# Patient Record
Sex: MALE | Race: BLACK OR AFRICAN AMERICAN | Employment: OTHER | URBAN - METROPOLITAN AREA
[De-identification: names, ages, dates, MRNs, and addresses within clinical notes are randomized per-mention and may not be internally consistent; named-entity substitution may affect disease eponyms.]

---

## 2023-02-21 RX ORDER — EPINEPHRINE 0.1 MG/ML
1 SYRINGE (ML) INJECTION ONCE
Status: CANCELLED | OUTPATIENT
Start: 2023-02-21 | End: 2023-02-21

## 2023-02-21 RX ORDER — GLYCOPYRROLATE 0.2 MG/ML
0.1 INJECTION INTRAMUSCULAR; INTRAVENOUS ONCE
Status: CANCELLED | OUTPATIENT
Start: 2023-02-21 | End: 2023-02-21

## 2023-02-21 RX ORDER — SIMETHICONE 20 MG/.3ML
40 EMULSION ORAL EVERY 6 HOURS PRN
Status: CANCELLED | OUTPATIENT
Start: 2023-02-21

## 2023-02-21 RX ORDER — DIPHENHYDRAMINE HYDROCHLORIDE 50 MG/ML
25 INJECTION INTRAMUSCULAR; INTRAVENOUS EVERY 6 HOURS PRN
Status: CANCELLED | OUTPATIENT
Start: 2023-02-21

## 2023-02-22 ENCOUNTER — HOSPITAL ENCOUNTER (OUTPATIENT)
Facility: HOSPITAL | Age: 68
Setting detail: OUTPATIENT SURGERY
Discharge: HOME | End: 2023-02-22
Attending: INTERNAL MEDICINE | Admitting: INTERNAL MEDICINE
Payer: MEDICARE

## 2023-02-22 VITALS
OXYGEN SATURATION: 100 % | HEIGHT: 72 IN | HEART RATE: 74 BPM | WEIGHT: 299.2 LBS | SYSTOLIC BLOOD PRESSURE: 119 MMHG | BODY MASS INDEX: 40.52 KG/M2 | DIASTOLIC BLOOD PRESSURE: 66 MMHG | TEMPERATURE: 98.1 F | RESPIRATION RATE: 16 BRPM

## 2023-02-22 PROCEDURE — 99152 MOD SED SAME PHYS/QHP 5/>YRS: CPT | Performed by: INTERNAL MEDICINE

## 2023-02-22 PROCEDURE — 2709999900 HC NON-CHARGEABLE SUPPLY: Performed by: INTERNAL MEDICINE

## 2023-02-22 PROCEDURE — 3600007512: Performed by: INTERNAL MEDICINE

## 2023-02-22 PROCEDURE — 2580000003 HC RX 258: Performed by: INTERNAL MEDICINE

## 2023-02-22 PROCEDURE — 99153 MOD SED SAME PHYS/QHP EA: CPT | Performed by: INTERNAL MEDICINE

## 2023-02-22 PROCEDURE — 3600007502: Performed by: INTERNAL MEDICINE

## 2023-02-22 PROCEDURE — 88305 TISSUE EXAM BY PATHOLOGIST: CPT

## 2023-02-22 PROCEDURE — 7100000011 HC PHASE II RECOVERY - ADDTL 15 MIN: Performed by: INTERNAL MEDICINE

## 2023-02-22 PROCEDURE — 7100000010 HC PHASE II RECOVERY - FIRST 15 MIN: Performed by: INTERNAL MEDICINE

## 2023-02-22 PROCEDURE — 6360000002 HC RX W HCPCS: Performed by: INTERNAL MEDICINE

## 2023-02-22 RX ORDER — MIDAZOLAM HYDROCHLORIDE 1 MG/ML
5 INJECTION, SOLUTION INTRAMUSCULAR; INTRAVENOUS
Status: DISCONTINUED | OUTPATIENT
Start: 2023-02-22 | End: 2023-02-22 | Stop reason: HOSPADM

## 2023-02-22 RX ORDER — NALOXONE HYDROCHLORIDE 0.4 MG/ML
0.4 INJECTION, SOLUTION INTRAMUSCULAR; INTRAVENOUS; SUBCUTANEOUS PRN
Status: DISCONTINUED | OUTPATIENT
Start: 2023-02-22 | End: 2023-02-22 | Stop reason: HOSPADM

## 2023-02-22 RX ORDER — ACETAMINOPHEN 325 MG/1
325 TABLET ORAL AS NEEDED
COMMUNITY

## 2023-02-22 RX ORDER — PANTOPRAZOLE SODIUM 40 MG/1
1 TABLET, DELAYED RELEASE ORAL DAILY
COMMUNITY
Start: 2019-11-20

## 2023-02-22 RX ORDER — SODIUM CHLORIDE 9 MG/ML
INJECTION, SOLUTION INTRAVENOUS CONTINUOUS
Status: DISCONTINUED | OUTPATIENT
Start: 2023-02-22 | End: 2023-02-22 | Stop reason: HOSPADM

## 2023-02-22 RX ORDER — M-VIT,TX,IRON,MINS/CALC/FOLIC 27MG-0.4MG
1 TABLET ORAL DAILY
COMMUNITY

## 2023-02-22 RX ORDER — FENTANYL CITRATE 50 UG/ML
INJECTION, SOLUTION INTRAMUSCULAR; INTRAVENOUS PRN
Status: DISCONTINUED | OUTPATIENT
Start: 2023-02-22 | End: 2023-02-22 | Stop reason: ALTCHOICE

## 2023-02-22 RX ORDER — FLUMAZENIL 0.1 MG/ML
0.2 INJECTION INTRAVENOUS ONCE
Status: DISCONTINUED | OUTPATIENT
Start: 2023-02-22 | End: 2023-02-22 | Stop reason: HOSPADM

## 2023-02-22 RX ORDER — SODIUM CHLORIDE 9 MG/ML
INJECTION, SOLUTION INTRAVENOUS CONTINUOUS
Status: CANCELLED | OUTPATIENT
Start: 2023-02-22

## 2023-02-22 RX ORDER — TRIAMTERENE AND HYDROCHLOROTHIAZIDE 37.5; 25 MG/1; MG/1
1 TABLET ORAL DAILY
COMMUNITY
Start: 2017-05-28

## 2023-02-22 RX ORDER — ASPIRIN 81 MG/1
1 TABLET ORAL DAILY
COMMUNITY
Start: 2017-11-21

## 2023-02-22 RX ORDER — FEXOFENADINE HCL 180 MG/1
1 TABLET ORAL DAILY
COMMUNITY

## 2023-02-22 RX ORDER — MIDAZOLAM HYDROCHLORIDE 1 MG/ML
INJECTION INTRAMUSCULAR; INTRAVENOUS PRN
Status: DISCONTINUED | OUTPATIENT
Start: 2023-02-22 | End: 2023-02-22 | Stop reason: ALTCHOICE

## 2023-02-22 RX ORDER — DICYCLOMINE HCL 20 MG
20 TABLET ORAL DAILY
COMMUNITY
Start: 2017-06-05

## 2023-02-22 RX ORDER — AMLODIPINE BESYLATE 10 MG/1
10 TABLET ORAL DAILY
COMMUNITY
Start: 2017-05-29

## 2023-02-22 RX ORDER — ERGOCALCIFEROL 1.25 MG/1
50000 CAPSULE ORAL WEEKLY
COMMUNITY
Start: 2017-06-06

## 2023-02-22 RX ORDER — FENTANYL CITRATE 50 UG/ML
100 INJECTION, SOLUTION INTRAMUSCULAR; INTRAVENOUS
Status: DISCONTINUED | OUTPATIENT
Start: 2023-02-22 | End: 2023-02-22 | Stop reason: HOSPADM

## 2023-02-22 RX ORDER — LISINOPRIL 20 MG/1
20 TABLET ORAL DAILY
COMMUNITY
Start: 2017-06-07

## 2023-02-22 RX ORDER — NEBIVOLOL 10 MG/1
1 TABLET ORAL DAILY
COMMUNITY
Start: 2017-05-29

## 2023-02-22 RX ORDER — LEVOTHYROXINE SODIUM 0.1 MG/1
1 TABLET ORAL DAILY
COMMUNITY
Start: 2017-06-05

## 2023-02-22 RX ORDER — FLUTICASONE PROPIONATE 50 MCG
1 SPRAY, SUSPENSION (ML) NASAL DAILY
COMMUNITY
Start: 2017-11-01

## 2023-02-22 RX ADMIN — SODIUM CHLORIDE: 9 INJECTION, SOLUTION INTRAVENOUS at 09:13

## 2023-02-22 ASSESSMENT — PAIN - FUNCTIONAL ASSESSMENT: PAIN_FUNCTIONAL_ASSESSMENT: 0-10

## 2023-02-22 ASSESSMENT — PAIN SCALES - GENERAL: PAINLEVEL_OUTOF10: 0

## 2023-02-22 NOTE — PERIOP NOTE
Patient skin is warm, dry, and intact pre-procedure and post procedure. Skin warm, dry, and intact where grounding pad was placed to right posterior thigh for polyp removal with hot snare. Normal sinus rhythm maintained pre and post procedure. Medications reviewed and verified by . Polyps, hemorrhoids. Patient tolerated procedure well.

## 2023-02-22 NOTE — H&P
Assessment/Plan  # Detail Type Description    1. 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 Impression Pt of Dr. Rosy Fields, referred to GI for colonoscopy following two recurrences of Diverticulitis (treated). ______________________________________  Hx of Diverticulosis w/recurrent Diverticulitis:  (2/3/2022 - Presumed, no CT confirmation: Treated by PCP with Metronidazole 500mg three times daily and Cipro 500mg twice daily both for 7 days.)  (5/26/2022 - Presumed, no CT confirmation: Treated by PCP with Metronidazole 500mg three times daily and Cipro 500mg twice daily both for 7 days.)    *ABDOMINAL/KUB 2 VIEWS done on 4/18/2022-   Indication:  Right-sided pain for 1 week. Renal colic. Impression: No acute findings  ______________________________________  Pt states he still feels gassy/bloated (his usual diverticulitis presenting symptoms) - ? Residual inflammation  -Taking Simethicone has helped some. -[place]: #6 & #7, Needs more fiber. Patient Plan -Patient is advised to eat a 25 gram fiber diet, utilizing 'natural foods' and fiber supplements such as Benefiber (wheat dextran) or Metamucil (psyllium)    -Explained differences between Diverticulosis, versus complication of diverticulitis. Provided pt with printed handout explaining diverticulosis, diverticulitis, and recommended high fiber dietary changes. -Explained that diverticulitis puts the patient at increased risk of bowel perforation associated with taking strong laxatives like a bowel purge, or the colonoscopic procedure itself my result in perforation of inflamed diverticula. -Pt to contact office if lower abdominal pain worsens or if stool changes occur. As a CT scan would be necessary to either confirm or rule out acute diverticulitis, an abx treatment would need to be prescribed, if confirmed. 2. Assessment Personal hx of colonic polyps (Z86.010).     Impression Personal hx of colonic adenomas, last addressed in .  _________________________________  *Last colonoscopy (@95SO) was done on 2020 by Dr. Dylan Kirk @[facility]) Hx of adenomatous polyps. 2) Colon polyps x2, removed (Both TA). 3) Diverticulosis. 4) Internal Hemorrhoids. 3-5yr Recall recommended.    -Older records unavailable at this time, any records recovered will be scanned to chart at later date.  _________________________________  Average risk, no FHx of colon cancer. Asymptomatic of GI complaints. BM: 2/day  *PM/SH: Thyroid Dz, OA, HTN, Prediabetes, Prostate cancer (), Diverticulosis, IBS, GERD - Intermittent abd pains, N (no V). Patient Plan *C-scope Plan:  Colonoscopy ordered with Dr. Hayden Toro with Miralax bowel prep, and Mag Citrate 2 days before prep, and Miralax and stool softeners starting 3 days before prep.  -Patient is advised that they should take their aspirin (if prescribed) up until the day of procedure.  -Patient is advised that they should not take their Meloxicam the day before or the day of the procedure.  -Patient is advised to take Thyroid meds, BP meds, beta blockers, and any cardiac meds the AM of procedure with sip clear liquid after prep. *C-scope Risks:  Stressed importance of following all bowel preparation instructions. Explained the procedure to the patient including all risks and benefits. These risks consist of missed lesions on exam, bleeding, and bowel perforation with possible need for admission to the hospital, and in the most extensive of  circumstances, the patient may require surgery. Pt verbalized understanding of these risks and is agreeable with this procedure. Plan Orders Further diagnostic evaluations ordered today include(s) DIAGNOSTIC COLONOSCOPY to be performed. He will be scheduled for GASTROENTEROLOGY PROCEDURE, Next Lab Date is within 6 month on 2023.  Clinical information/comments: at location [facility]. The surgeon scheduled is Chris Gifford MD. An assistant has not been requested.         3. Assessment Family hx of colonic polyps (Z83.71).    Impression Mother - Colonic polyps.         4. Assessment GERD w/o esophagitis (K21.9).    Impression Hx of Mild-to-moderate Chronic GERD:    *PPI Therapy: Taking Pantoprazole 40mg QAM (proper timing)  *NSAID-use: Aleve, ASA, Meloxicam  *PRN Meds: Dicyclomine.    Patient Plan *PPI Dosing Instructions: (Printed copy provided to patient)  -Instructed patient to take PPI first thing in the morning on an empty stomach with a full glass of water, 30 minutes before eating any food to get the full effect of the medication as intended. Use Tums, or Maalox for breakthrough reflux symptoms.  ___________________________________  *GERD \"Anti-Reflux Diet\"- Patient is advised to:   1. Eat smaller meals and maintain and lowfat, low carb diet  2. Change to decaffeinated beverages only  3. Not eat within 3-4 hours of bedtime  4. To elevate the torso while sleeping at night either by sleeping on a foam incline wedge or by elevating the head of the bed.  5. Avoid alcohol, NSAIDs, citrus and acidic foods as these things can contribute to acid reflux.  (Enteric Coated Aspirin only - If prescribed)         5. Assessment Personal history of cancer of prostate (Z85.46).    Impression Hx of Prostate cancer (2010).         6. Assessment Body mass index (BMI) 40.0-44.9, adult (Z68.41).    Impression BMI: 40.69(Obese w/Tall Frame), These factors increase risks of procedural complications with sedation, and compromise airway maintenance. Special attention to airway management.              This 67 year old  patient was referred by Juvenal Nunez.    This 67 year old male presents for Hx polyp/colon cancer.    History of Present Illness  1.  Hx polyp/colon cancer   Prior screening:  colonoscopy.  Risk Factors: family history - parent and mother polyps.  Associated symptoms include abdominal pain and nausea.  Pertinent negatives include change in bowel habits, change in stool  caliber, constipation, decreased appetite, diarrhea, melena, rectal bleeding, vomiting, weight gain and weight loss. Additional information: No family history of colon cancer, Last colonoscopy was , Xray of abdomen 22 and BM 2x daily. 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 Kathleen Padgett prostate cancer  RS 2018 -Dr. Sandi Mauricio     eye surgery  RS 2018 - [facility]     eye surgery  RS 2018 - eye clinic Dr Hailey Mcneal, prostate         Cardiac Surgery     Claustrophobia       Diabetes       Hypertension             Family History   (Detailed)    Relationship Family Member Name  Age at Death Condition Onset Age Cause of Death   Brother  Y 67 Diabetes mellitus  N   Brother 2 N  Alive and well     Father  Y 58      Father    Cancer, lung  N   Father    Hypertension  N   Mother    Hypertension  N   Mother  Y 78 Diabetes mellitus  N   Sister  N  Alive and well       Social History  (Detailed)  Tobacco use reviewed. The patient is right-handed. Preferred language is [place]. The patient does not need an . Marital Status/Family/Social Support  Marital status: Single     Tobacco use status: Current non-smoker. Smoking status: Never smoker. Tobacco Screening  Patient has never used tobacco. Patient has not used tobacco in the last 30 days. Patient has not used smokeless tobacco in the last 30 days.     Smoking Status  Type Smoking Status Usage Per Day Years Used Pack Years Total Pack Years    Never smoker Tobacco/Vaping Exposure  No passive vaping exposure. No passive smoke exposure. Alcohol  There is no history of alcohol use. Caffeine  The patient uses caffeine: tea - 8 oz a day. Lifestyle  Never exercises. Pets   cats. Home Environment/Safety  The home has smoke detectors. No carbon monoxide detector at home. The home does not have radon present. Uses seat belts.        Medications (active prior to today)  Medication Instructions Start Date Stop Date Refilled Elsewhere   Aleve 220 mg tablet take 1 tablet by oral route  every 12 hours as needed //   Y   Flonase Allergy Relief 50 mcg/actuation nasal spray,suspension inhale 2 spray by intranasal route  every day in each nostril //   Y   aspirin 81 mg tablet,delayed release take 1 tablet by oral route  every day 12/17/2018 12/17/2018 N   chlorpheniramine 4 mg tablet take 1 tablet by oral route  every 4 hours as needed //   Y   meloxicam 7.5 mg tablet take 1 tablet by oral route  every day 10/05/2020   N   Shingrix (PF) 50 mcg/0.5 mL intramuscular suspension, kit inject 0.5 milliliter by intramuscular route once 01/05/2021   N   Xyzal 5 mg tablet take 1 tablet by oral route  every day in the evening 03/25/2021 03/25/2021 N   MONTELUKAST SOD 10 MG TABLET TAKE 1 TABLET BY MOUTH EVERY DAY IN THE EVENING 03/31/2021 03/31/2021 N   AMLODIPINE BESYLATE 10 MG TAB TAKE 1 TABLET BY MOUTH EVERY DAY 10/28/2021  10/28/2021 N   LEVOTHYROXINE 100 MCG TABLET TAKE 1 TABLET BY MOUTH EVERY DAY 12/28/2021 12/28/2021 N   PANTOPRAZOLE SOD DR 40 MG TAB TAKE 1 TABLET BY MOUTH EVERY DAY 12/28/2021 12/28/2021 N   NEBIVOLOL 10 MG TABLET TAKE 1 TABLET BY MOUTH EVERY DAY 12/28/2021 12/28/2021 N   DermOtic Oil 0.01 % ear drops INSTILL 4-5 DROPS IN LEFT EAR ONLY ONCE DAILY IN EVENING 02/28/2022 02/28/2022 N   TRIAMTERENE-HCTZ 37.5-25 MG TB TAKE 1 TABLET BY MOUTH EVERY DAY 02/28/2022 02/28/2022 N   VITAMIN D2 1.25MG(50,000 UNIT) TAKE 1 CAPSULE BY ORAL ROUTE ONCE PER WEEK FOR 4 WEEKS THEN ONCE PER MONTH 05/16/2022 05/16/2022 N   metronidazole 500 mg tablet take 1 tablet by oral route  every 8 hours 05/26/2022 05/26/2022 N   ciprofloxacin 500 mg tablet take 1 tablet by oral route  every 12 hours 05/26/2022 05/26/2022 N   DICYCLOMINE 20 MG TABLET TAKE 1 TABLET BY MOUTH FOUR TIMES A DAY AS NEEDED 06/14/2022 06/14/2022 N   LISINOPRIL 20 MG TABLET TAKE 1 TABLET BY MOUTH EVERY DAY 06/28/2022 06/28/2022 N       Medication Reconciliation  Medications reconciled today.     Medication Reviewed  Adherence Medication Name Sig Desc Elsewhere Status   taking as directed meloxicam 7.5 mg tablet take 1 tablet by oral route  every day N Verified   taking as directed chlorpheniramine 4 mg tablet take 1 tablet by oral route  every 4 hours as needed Y Verified   taking as directed LEVOTHYROXINE 100 MCG TABLET TAKE 1 TABLET BY MOUTH EVERY DAY N Verified   taking as directed AMLODIPINE BESYLATE 10 MG TAB TAKE 1 TABLET BY MOUTH EVERY DAY N Verified   taking as directed DICYCLOMINE 20 MG TABLET TAKE 1 TABLET BY MOUTH FOUR TIMES A DAY AS NEEDED N Verified   taking as directed Flonase Allergy Relief 50 mcg/actuation nasal spray,suspension inhale 2 spray by intranasal route  every day in each nostril Y Verified   taking as directed TRIAMTERENE-HCTZ 37.5-25 MG TB TAKE 1 TABLET BY MOUTH EVERY DAY N Verified   taking as directed LISINOPRIL 20 MG TABLET TAKE 1 TABLET BY MOUTH EVERY DAY N Verified   taking as directed VITAMIN D2 1.25MG(50,000 UNIT) TAKE 1 CAPSULE BY ORAL ROUTE ONCE PER WEEK FOR 4 WEEKS THEN ONCE PER MONTH N Verified   taking as directed MONTELUKAST SOD 10 MG TABLET TAKE 1 TABLET BY MOUTH EVERY DAY IN THE EVENING N Verified   taking as directed Xyzal 5 mg tablet take 1 tablet by oral route  every day in the evening N Verified   taking as directed PANTOPRAZOLE SOD DR 40 MG TAB TAKE 1 TABLET BY MOUTH EVERY DAY N Verified   taking as directed NEBIVOLOL 10 MG TABLET TAKE 1 TABLET BY MOUTH EVERY DAY N Verified   taking as directed Aleve 220 mg tablet take 1 tablet by oral route  every 12 hours as needed Y Verified   taking as directed DermOtic Oil 0.01 % ear drops INSTILL 4-5 DROPS IN LEFT EAR ONLY ONCE DAILY IN EVENING N Verified   taking as directed ciprofloxacin 500 mg tablet take 1 tablet by oral route  every 12 hours N Verified   taking as directed Shingrix (PF) 50 mcg/0.5 mL intramuscular suspension, kit inject 0.5 milliliter by intramuscular route once N Verified   taking as directed metronidazole 500 mg tablet take 1 tablet by oral route  every 8 hours N Verified   taking as directed aspirin 81 mg tablet,delayed release take 1 tablet by oral route  every day N Verified     Medications (Added, Continued or Stopped today)  Start Date Medication Directions PRN Status PRN Reason Instruction Stop Date    Aleve 220 mg tablet take 1 tablet by oral route  every 12 hours as needed N      10/28/2021 AMLODIPINE BESYLATE 10 MG TAB TAKE 1 TABLET BY MOUTH EVERY DAY N      12/17/2018 aspirin 81 mg tablet,delayed release take 1 tablet by oral route  every day N       chlorpheniramine 4 mg tablet take 1 tablet by oral route  every 4 hours as needed N      05/26/2022 ciprofloxacin 500 mg tablet take 1 tablet by oral route  every 12 hours N      02/28/2022 DermOtic Oil 0.01 % ear drops INSTILL 4-5 DROPS IN LEFT EAR ONLY ONCE DAILY IN EVENING N      06/14/2022 DICYCLOMINE 20 MG TABLET TAKE 1 TABLET BY MOUTH FOUR TIMES A DAY AS NEEDED N       Flonase Allergy Relief 50 mcg/actuation nasal spray,suspension inhale 2 spray by intranasal route  every day in each nostril N      12/28/2021 LEVOTHYROXINE 100 MCG TABLET TAKE 1 TABLET BY MOUTH EVERY DAY N      06/28/2022 LISINOPRIL 20 MG TABLET TAKE 1 TABLET BY MOUTH EVERY DAY N      10/05/2020 meloxicam 7.5 mg tablet take 1 tablet by oral route  every day N      05/26/2022 metronidazole 500 mg tablet take 1 tablet by oral route  every 8 hours N      03/31/2021 MONTELUKAST SOD 10 MG TABLET TAKE 1 TABLET BY MOUTH EVERY DAY IN THE EVENING N      12/28/2021 NEBIVOLOL 10 MG TABLET TAKE 1 TABLET BY MOUTH EVERY DAY N      12/28/2021 PANTOPRAZOLE SOD DR 40 MG TAB TAKE 1 TABLET BY MOUTH EVERY DAY N      01/05/2021 Shingrix (PF) 50 mcg/0.5 mL intramuscular suspension, kit inject 0.5 milliliter by intramuscular route once N      02/28/2022 TRIAMTERENE-HCTZ 37.5-25 MG TB TAKE 1 TABLET BY MOUTH EVERY DAY N      05/16/2022 VITAMIN D2 1.25MG(50,000 UNIT) TAKE 1 CAPSULE BY ORAL ROUTE ONCE PER WEEK FOR 4 WEEKS THEN ONCE PER MONTH N      03/25/2021 Xyzal 5 mg tablet take 1 tablet by oral route  every day in the evening N        Allergies  Ingredient Reaction (Severity) Medication Name Comment   CODEINE Bradycardia       Reviewed, no changes. Review of Systems  System Neg/Pos Details   Constitutional Negative Fever, Weight gain and Weight loss. ENMT Negative Sinus Infection. Eyes Negative Double vision. Respiratory Negative Asthma, Chronic cough and Dyspnea. Cardio Negative Chest pain, Edema and Irregular heartbeat/palpitations. GI Positive Abdominal pain, Nausea. GI Negative Change in bowel habits, Change in stool caliber, Constipation, Decreased appetite, Diarrhea, Dysphagia, Heartburn, Hematemesis, Hematochezia, Melena, Rectal bleeding, Reflux and Vomiting.  Negative Dysuria and Hematuria. Endocrine Negative Cold intolerance and Heat intolerance. Neuro Negative Dizziness, Headache, Numbness and Tremors. Psych Negative Anxiety, Depression and Increased stress. Integumentary Negative Hives, Pruritus and Rash. MS Negative Back pain, Joint pain and Myalgia. Hema/Lymph Negative Easy bleeding, Easy bruising and Lymphadenopathy. Allergic/Immuno Negative Food allergies and Immunosuppression.        Vital Signs   Height  Time ft in cm Last Measured Height Position   1:00 PM 6.0 0.00 182.88 11/15/2019 Standing     Weight/BSA/BMI  Time lb oz kg Context BMI kg/m2 BSA m2 1:00 .00  136.078 dressed with shoes 40.69      Date/Time Temp Pulse Resp BP SpO2 O2 Device O2 Flow Rate (L/min) Weight [facility]   02/22/23 0914 98.6 °F (37 °C) 91 16 123/73 100 % None (Room air) -- 299 lb 3.2 oz (135.7 kg) CS     Physical  Exam  Exam Findings Details   Male GI Quick Exam Comments Obese w/Tall Frame. Constitutional Normal Well developed. Eyes Normal Conjunctiva - Right: Normal, Left: Normal. Sclera - Right: Normal, Left: Normal.   Nasopharynx Normal Lips/teeth/gums - Normal.   Neck Exam Normal Inspection - Normal.   Respiratory Normal Inspection - Normal.   Cardiovascular Normal Regular rate and rhythm. No murmurs, gallops, or rubs. Vascular Normal Pulses - Brachial: Normal.   Skin Normal Inspection - Normal.   Musculoskeletal Normal Hands/Wrist - Right: Normal, Left: Normal.   Extremity Normal No edema. Neurological Normal Fine motor skills - Normal.   Psychiatric Normal Orientation - Oriented to time, place, person & situation. Appropriate mood and affect. Patient Education  # Patient Education   1.  Colon Polyps: Care Instructions       Immunizations Entered by History  Date Immunization   6/11/2021 12:00:00 AM Zostavax   5/26/2021 12:00:00 AM SARS-COV-2 (COVID-19) vaccine, mRNA, spike protein, LNP, preservative free, 100 mcg/0.5mL dose       Active Patient Care Team Members  Name Contact Agency Type Support Role Relationship Active Date Inactive Date Specialty   Jessica Torres   Emergency Contact Other Adult      [facility]   Patient provider PCP   Family Practice   [facility]   primary practice provider    Internal Medicine   [facility]   encounter provider    Gastroenterology   Patient questioned and examined

## 2023-02-22 NOTE — DISCHARGE INSTRUCTIONS
Theodore Lord  [id number]  1955    COLON DISCHARGE INSTRUCTIONS    Discomfort:  Redness at IV site- apply warm compress to area; if redness or soreness persist- contact your physician  There may be a slight amount of blood passed from the rectum  Gaseous discomfort- walking, belching will help relieve any discomfort  You may not operate a vehicle til the next day. You may not engage in an occupation involving machinery or appliances til the next day. You may not drink alcoholic beverages til the next day. DIET:   High fiber diet. ACTIVITY:  You may not  resume your normal daily activities til the next day. it is recommended that you spend the remainder of the day resting -  avoid any strenuous activity. CALL M.D.  IF ANY SIGN OF:   Increasing pain, nausea, vomiting  Abdominal distension (swelling)  New increased bleeding (oral or rectal)  Fever (chills)  Pain in chest area  Bloody discharge from nose or mouth  Shortness of breath    You may not  take any Advil, Aspirin, Ibuprofen, Motrin, Aleve, or Goodys for 5 days, ONLY  Tylenol as needed for pain. Post procedure diagnosis:  Small internal hemorrhoids. slightly long and loopy sigmoid colon, 3 small 3 to 4 mm in the cecum, cold snared. 2 small 4 and 6 mm sessile and semipedunculated polyps in the ascending colon, hot snared    Follow-up Instructions: Your follow up colonoscopy will be in 5 years. We will notify you the results of your biopsy by letter within 2 weeks. Luigi Thompson MD  February 22, 2023       DISCHARGE SUMMARY from Nurse    PATIENT INSTRUCTIONS:    After general anesthesia or intravenous sedation, for 24 hours or while taking prescription Narcotics:  Limit your activities  Do not drive and operate hazardous machinery  Do not make important personal or business decisions  Do  not drink alcoholic beverages  If you have not urinated within 8 hours after discharge, please contact your surgeon on call.     Report the following to your surgeon:  Excessive pain, swelling, redness or odor of or around the surgical area  Temperature over 100.5  Nausea and vomiting lasting longer than 4 hours or if unable to take medications  Any signs of decreased circulation or nerve impairment to extremity: change in color, persistent  numbness, tingling, coldness or increase pain  Any questions    What to do at Home:  Recommended activity: as above,     If you experience any of the following symptoms as above, please follow up with Dr. Satinder Jasmine. *  Please give a list of your current medications to your Primary Care Provider. *  Please update this list whenever your medications are discontinued, doses are      changed, or new medications (including over-the-counter products) are added. *  Please carry medication information at all times in case of emergency situations. These are general instructions for a healthy lifestyle:    No smoking/ No tobacco products/ Avoid exposure to second hand smoke  Surgeon General's Warning:  Quitting smoking now greatly reduces serious risk to your health. Obesity, smoking, and sedentary lifestyle greatly increases your risk for illness    A healthy diet, regular physical exercise & weight monitoring are important for maintaining a healthy lifestyle    You may be retaining fluid if you have a history of heart failure or if you experience any of the following symptoms:  Weight gain of 3 pounds or more overnight or 5 pounds in a week, increased swelling in our hands or feet or shortness of breath while lying flat in bed. Please call your doctor as soon as you notice any of these symptoms; do not wait until your next office visit. The discharge information has been reviewed with the patient and caregiver. The patient and caregiver verbalized understanding.   Discharge medications reviewed with the patient and caregiver and appropriate educational materials and side effects teaching were provided.     Patient armband removed and shredded    ___________________________________________________________________________________________________________________________________

## 2023-02-22 NOTE — PROCEDURES
[facility]  Colonoscopy Procedure Report  _______________________________________________________  Patient: Sangeeta Richter                                        Attending Physician: Juan Francisco Cervantes MD    Patient ID: [id number]                                    Referring Physician: Ebenezer Kaufman MD    Exam Date: 2023     Introduction: A  75 y.o. male patient, presents for inpatient Colonoscopy    Indications: Pt of Dr. Marisel Raza, referred to GI for colonoscopy following two recurrences of Diverticulitis (treated). (2/3/2022 - Presumed, no CT confirmation: Treated by PCP with Metronidazole 500mg three times daily and Cipro 500mg twice daily both for 7 days.)  (2022 - Presumed, no CT confirmation: Treated by PCP with Metronidazole 500mg three times daily and Cipro 500mg twice daily both for 7 days.)  ABDOMINAL/KUB 2 VIEWS done on 2022- Right-sided pain for 1 week. Renal colic. Impression: No acute findings  Pt states he still feels gassy/bloated (his usual diverticulitis presenting symptoms) - ? Residual inflammation  -Taking Simethicone has helped some. -[place]: #6 & #7, Needs more fiber. Has one bm daily. No fx hx of colon cancer. He had total prostatectomy in   Last colonoscopy (@55BB) was done on 2020 by Dr. Dowell Ly @[facility]) Hx of adenomatous polyps. 2) Colon polyps x2, removed (Both TA). 3) Diverticulosis. 4) Internal Hemorrhoids. 3-5yr Recall recommended. No FHx of colon cancer. Asymptomatic of GI complaints. BM: 2/day  *PM/SH: Thyroid Dz, OA, HTN, Prediabetes, Prostate cancer (), Diverticulosis, IBS, GERD - Intermittent abd pains, N (no V). Consent: The benefits, risks, and alternatives to the procedure were discussed and informed consent was obtained from the patient. Preparation: EKG, pulse, pulse oximetry and blood pressure were monitored throughout the procedure. ASA Classification: Class II- .  The heart is an S1-S2 and regular heart rate and rhythm. Lungs are clear to auscultation and percussion. Abdomen is soft, nondistended, and nontender. Mental Status: awake, alert, and oriented to person, place, and time    Medications:  Fentanyl 100 mcg IV before procedure. Versed 5 mg IV throughout the procedure. Rectal Exam: Normal Rectal Exam. No Blood. Prostate not enlarged. Pathology Specimens:  1    Procedure: The colonoscope was passed with difficulty through the anus under direct visualization and advanced to the cecum. Retroflexion is made in the ascending colon. The patient required positioning on the back and external counter pressure to aid in the passage of the scope. The scope was withdrawn and the mucosa was carefully examined. The quality of the preparation was excellent. The views were excellent. The patient's toleration of the procedure was excellent. The exam was done twice to the cecum. Total time is 26 minutes and withdrawal time is 19 minutes. Findings:    Rectum:   Small internal hemorrhoids. Sigmoid:   slightly long and loopy sigmoid colon. Descending Colon:   Normal   Transverse Colon:   Normal   Ascending Colon:   2 small 4 and 6 mm sessile and semipedunculated polyps in the ascending colon, hot snared. Cecum:    3 small 3 to 4 mm in the cecum, cold snared. Terminal Ileum:   Not entered. Unplanned Events: There were no unplanned events. Estimated Blood Loss: Negligible  IMPLANTS: * No implants in log *  Impressions: Small internal hemorrhoids. slightly long and loopy sigmoid colon, 3 small 3 to 4 mm in the cecum, cold snared. 2 small 4 and 6 mm sessile and semipedunculated polyps in the ascending colon, hot snared. Normal Mucosa. No blood or AVM found. Complications: None; patient tolerated the procedure well. Recommendations:  Discharge home when standard parameters are met. Resume a high fiber diet. Resume own medications. Avoid all NSAID's for 5 days. Colonoscopy recommendation in 5 years.   Take Miralax and/ or Colace 100 mg on regular basis if constipated    Procedure Codes:    Aas Newell [[id number]]    Endoscope Information:  Model Number(s)    [id number]   Assistant: None  Signed By: Jairon Andrade MD Date: 2/22/2023

## 2023-02-22 NOTE — PRE SEDATION
Sedation Pre-Procedure Note    Patient Name: Yenni Albright   YOB: 1955  Room/Bed: ENDO/PL  Medical Record Number: [id number]  Date: 2/22/2023   Time: 10:27 AM       Indication:  history of colon polyps    Consent: I have discussed with the patient and/or the patient representative the indication, alternatives, and the possible risks and/or complications of the planned procedure and the anesthesia methods. The patient and/or patient representative appear to understand and agree to proceed. Vital Signs:   Vitals:    02/22/23 0914   BP: 123/73   Pulse: 91   Resp: 16   Temp: 98.6 °F (37 °C)   SpO2: 100%       Past Medical History:   has a past medical history of Cancer ([address] Gastric reflux, Hypertension, Pseudotumor, and Thyroid disease. Past Surgical History:   has a past surgical history that includes Prostatectomy (2010); Cataract removal (Right, 2020); and Cataract removal (Left, 2021). Medications:   Scheduled Meds:    flumazenil  0.2 mg IntraVENous Once     Continuous Infusions:    sodium chloride 100 mL/hr at 02/22/23 0913    fentaNYL      midazolam       PRN Meds: naloxone  Home Meds:   Prior to Admission medications    Medication Sig Start Date End Date Taking?  Authorizing Provider   amLODIPine (NORVASC) 10 MG tablet Take 10 mg by mouth daily 5/29/17  Yes Historical Provider, MD   aspirin 81 MG EC tablet Take 1 tablet by mouth daily 11/21/17  Yes Historical Provider, MD   dicyclomine (BENTYL) 20 MG tablet Take 20 mg by mouth daily 6/5/17  Yes Historical Provider, MD   ergocalciferol (ERGOCALCIFEROL) 1.25 MG (67845 UT) capsule Take 50,000 Units by mouth once a week 6/6/17  Yes Historical Provider, MD   fluticasone (FLONASE) 50 MCG/ACT nasal spray 1 spray by Each Nostril route daily 11/1/17  Yes Historical Provider, MD   levothyroxine (SYNTHROID) 100 MCG tablet Take 1 tablet by mouth daily 6/5/17  Yes Historical Provider, MD   lisinopril (PRINIVIL;ZESTRIL) 20 MG tablet Take 20 mg by mouth daily 6/7/17  Yes Historical Provider, MD   nebivolol (BYSTOLIC) 10 MG tablet Take 1 tablet by mouth daily 5/29/17  Yes Historical Provider, MD   pantoprazole (PROTONIX) 40 MG tablet Take 1 tablet by mouth daily 11/20/19  Yes Historical Provider, MD   triamterene-hydroCHLOROthiazide (MAXZIDE-25) 37.5-25 MG per tablet Take 1 tablet by mouth daily 5/28/17  Yes Historical Provider, MD   acetaminophen (TYLENOL) 325 MG tablet Take 325 mg by mouth as needed    Historical Provider, MD   Multiple Vitamins-Minerals (THERAPEUTIC MULTIVITAMIN-MINERALS) tablet Take 1 tablet by mouth daily    Historical Provider, MD   fexofenadine (ALLEGRA) 180 MG tablet Take 1 tablet by mouth daily    Historical Provider, MD     Coumadin Use Last 7 Days:  no  Antiplatelet drug therapy use last 7 days: no  Other anticoagulant use last 7 days: no  Additional Medication Information:  baby ASA      Pre-Sedation Documentation and Exam:   Vital signs have been reviewed (see flow sheet for vitals). I have reviewed the patient's history and review of systems.     Mallampati Airway Assessment:  dentition not prohibitive, normal neck range of motion, Mallampati Class I - (soft palate, fauces, uvula & anterior/posterior tonsillar pillars are visible)    Prior History of Anesthesia Complications:   none    ASA Classification:  Class 2 - A normal healthy patient with mild systemic disease    Sedation/ Anesthesia Plan:   intravenous sedation    Medications Planned:   midazolam (Versed) intravenously and fentanyl intravenously    Patient is an appropriate candidate for plan of sedation: yes    Electronically signed by Tom Catalan MD on 2/22/2023 at 10:27 AM

## 2025-03-03 ENCOUNTER — TELEPHONE (OUTPATIENT)
Facility: HOSPITAL | Age: 70
End: 2025-03-03

## (undated) DEVICE — WRISTBAND ID AD W2.5XL9.5CM RED VYN ADH CLSR UNI-PRINT

## (undated) DEVICE — NEEDLE SYR 18GA L1.5IN RED PLAS HUB S STL BLNT FILL W/O

## (undated) DEVICE — SYRINGE 50ML E/T

## (undated) DEVICE — TUBING, SUCTION, 1/4" X 12', STRAIGHT: Brand: MEDLINE

## (undated) DEVICE — CANNULA CUSH AD W/ 14FT TBG

## (undated) DEVICE — SYRINGE MED 5ML STD CLR PLAS LUERLOCK TIP N CTRL DISP

## (undated) DEVICE — CATHETER PH SUCT 14FR

## (undated) DEVICE — Device

## (undated) DEVICE — ERBE NESSY®PLATE 170 SPLIT; 168CM²; CABLE 3M: Brand: ERBE

## (undated) DEVICE — KENDALL RADIOLUCENT FOAM MONITORING ELECTRODE RECTANGULAR SHAPE: Brand: KENDALL

## (undated) DEVICE — CATHETER IV 22GA L1IN BLU POLYUR STR HUB RADPQ PROTCT +

## (undated) DEVICE — SINGLE PORT MANIFOLD: Brand: NEPTUNE 2

## (undated) DEVICE — SET ADMIN 16ML TBNG L100IN 2 Y INJ SITE IV PIGGY BK DISP (ORDER IN MULIPLES OF 48)

## (undated) DEVICE — SPONGE GZ W4XL4IN COT 12 PLY TYP VII WVN C FLD DSGN STERILE

## (undated) DEVICE — TRAP SPEC POLYP REM STRNR CLN DSGN MAGNIFYING WIND DISP

## (undated) DEVICE — NEEDLE FLTR 18GA L1.5IN MEM THK5UM BLNT DISP

## (undated) DEVICE — SYRINGE MED 3ML CLR PLAS STD N CTRL LUERLOCK TIP DISP

## (undated) DEVICE — SPONGE GZ W4XL4IN RAYON POLY 4 PLY NONWOVEN FASTER WICKING

## (undated) DEVICE — TOURNIQUET PHLEB W1XL18IN BLU FLAT RL AND BND REUSE FOR IV

## (undated) DEVICE — GARMENT,MEDLINE,DVT,INT,CALF,MED, GEN2: Brand: MEDLINE

## (undated) DEVICE — SOLUTION IV 500ML 0.9% SOD CHL FLX CONT